# Patient Record
Sex: MALE | Race: WHITE
[De-identification: names, ages, dates, MRNs, and addresses within clinical notes are randomized per-mention and may not be internally consistent; named-entity substitution may affect disease eponyms.]

---

## 2020-01-24 ENCOUNTER — HOSPITAL ENCOUNTER (INPATIENT)
Dept: HOSPITAL 93 - ER | Age: 85
LOS: 37 days | DRG: 981 | End: 2020-03-01
Attending: ORTHOPAEDIC SURGERY | Admitting: ORTHOPAEDIC SURGERY
Payer: COMMERCIAL

## 2020-01-24 VITALS — BODY MASS INDEX: 19.7 KG/M2 | HEIGHT: 68 IN | WEIGHT: 130 LBS

## 2020-01-24 DIAGNOSIS — J15.7: ICD-10-CM

## 2020-01-24 DIAGNOSIS — D50.8: ICD-10-CM

## 2020-01-24 DIAGNOSIS — N35.811: ICD-10-CM

## 2020-01-24 DIAGNOSIS — N40.0: ICD-10-CM

## 2020-01-24 DIAGNOSIS — F02.80: ICD-10-CM

## 2020-01-24 DIAGNOSIS — R13.14: ICD-10-CM

## 2020-01-24 DIAGNOSIS — K29.00: ICD-10-CM

## 2020-01-24 DIAGNOSIS — T17.898A: ICD-10-CM

## 2020-01-24 DIAGNOSIS — S72.141A: ICD-10-CM

## 2020-01-24 DIAGNOSIS — E87.0: ICD-10-CM

## 2020-01-24 DIAGNOSIS — R65.21: ICD-10-CM

## 2020-01-24 DIAGNOSIS — K80.80: ICD-10-CM

## 2020-01-24 DIAGNOSIS — E87.4: ICD-10-CM

## 2020-01-24 DIAGNOSIS — G47.33: ICD-10-CM

## 2020-01-24 DIAGNOSIS — J10.08: ICD-10-CM

## 2020-01-24 DIAGNOSIS — G30.0: ICD-10-CM

## 2020-01-24 DIAGNOSIS — Z79.01: ICD-10-CM

## 2020-01-24 DIAGNOSIS — J95.822: ICD-10-CM

## 2020-01-24 DIAGNOSIS — B37.49: ICD-10-CM

## 2020-01-24 DIAGNOSIS — Z86.73: ICD-10-CM

## 2020-01-24 DIAGNOSIS — B37.1: ICD-10-CM

## 2020-01-24 DIAGNOSIS — J95.89: ICD-10-CM

## 2020-01-24 DIAGNOSIS — D62: ICD-10-CM

## 2020-01-24 DIAGNOSIS — N18.3: ICD-10-CM

## 2020-01-24 DIAGNOSIS — I08.3: ICD-10-CM

## 2020-01-24 DIAGNOSIS — N17.8: ICD-10-CM

## 2020-01-24 DIAGNOSIS — E86.0: ICD-10-CM

## 2020-01-24 DIAGNOSIS — I50.23: ICD-10-CM

## 2020-01-24 DIAGNOSIS — I13.0: ICD-10-CM

## 2020-01-24 DIAGNOSIS — I48.0: Primary | ICD-10-CM

## 2020-01-24 DIAGNOSIS — G72.81: ICD-10-CM

## 2020-01-24 DIAGNOSIS — R31.0: ICD-10-CM

## 2020-01-24 DIAGNOSIS — B96.1: ICD-10-CM

## 2020-01-24 DIAGNOSIS — D65: ICD-10-CM

## 2020-01-24 DIAGNOSIS — J91.8: ICD-10-CM

## 2020-01-24 DIAGNOSIS — Z99.81: ICD-10-CM

## 2020-01-24 DIAGNOSIS — J98.11: ICD-10-CM

## 2020-01-24 DIAGNOSIS — K26.3: ICD-10-CM

## 2020-01-24 DIAGNOSIS — J69.8: ICD-10-CM

## 2020-01-24 NOTE — NUR
PTE ALERTA Y ORIENTADO X3 ACOMPANADO EN SILLA DE CLAUDINE, PTE MARIAH CON SIGO
ADMISION DIRECTA DE .

## 2020-01-24 NOTE — NUR
2300 SE ENTREGA PACIENTE A LEILA
         CONECTADO A MONITOR CARDIACO OXIMETRO DE
PULSO 0.9NSS POR PERIFERAL RT PATENTE BAJANDO A 80ML/HR CARDIZEM DRIP POR
PERIFERAL LT PATENTE NU DE EDEMA Y ERITEMA A 10 ML/HR, DESHAUN CALDERA CON
ORINA AMARILLO ZANDRA, DONOVAN TRATION DE 5 LBS EN PIERNA RT. PACIENTE ALERTA Y
ORIENTADO X3, PENDIENTE REALIZAR CT DE ESTREMIDADES INFERIORES

## 2020-01-24 NOTE — NUR
PT ALERTA Y ORIENTADO X3 ESFERAS SE LE ORIENTA SOBRE TX Y REFIERE ENTEDER. SE
PHOENIX MUESTRAS DE LUIS ENRIQUE Y VENOPUNCION CON TECNICAS ASEPTICSA. SE ADMINISTRAN
IVFLUIDS ORDENADOS. PT TOLERA TX. SE REALIZA EKG Y SE PRESENTA A DR BINGHAM. SE
INTNETA REALIZAR INSERCION DE CALDERA CATETER CON TECNICAS ASEPTICSA Y ESTERILES
CON CALDERA FRANGE- 16, SIN EXITO. SE INTENTA CON CATETER FRANGE 12, SIN EXITO.
FAMILIAR INDICA NO TENER NINGUN PROBLEMA PARA ORINAR, SE JAMISON DESHAUN CALDERA,
PENDIENTE PARA MUESTRA DE U/A. PT TOLERA TX, TRANQUILO Y SIN DIFICULTAD
RESPIRATORIA. 
DR BINGHAM ORDENA TRASLADAR A UNIDAD DE CRITICO. SE TRASLADA PT EN LYNNETTE EN
COMPANIA DE ESCOLTA Y FAMILIAR A UNIDAD DE CRITICO ER Y SE UBICA EN CAMA 2. SE
CONECTA A MONITOR CARDIACO Y SATUROMETRO DE PULSO. A CARGO DE MARCK PARSON.

## 2020-01-24 NOTE — NUR
SE RECIBE MASCULINO ALERTA Y ORIENTADO POR MELVI ESFERAS, EN CAMA CON BARANDAS
SEGURAS Y ELEVADAS. CONECTADO A MONITOR CARDIACO CON OXIMETRIA DE PULSO.
CANALIZADO EN BRAZO DERECHO X1 Y EN BRAZO SEAN X1 CON AREA DE
VENOPUNCIONES LIBRES DE EDEMA O ENROJECIMIENTO. RECIBIENDO 0.9% NSS @ 80 ML/HR.
CARDIZEM DRIP 125MG/100ML @ 10 ML/HR. CONDOM CALDERA PRESENTE. REFIERE TENER
DOLOR EN CAERA Y RODILLA IZQUIERDA. SE MANTIENE NPO. SE MANTIENE EN OBSERVACION
POR CAMBIOS.

## 2020-01-24 NOTE — NUR
SE RECIBE PACIENTE ALERTA Y ORIENTADO X3 ACOMPANADO POR SCRUGGS ENCARGADA, PACIENTE
CON ORDEN DE DR. BINGHAM PARA CONECTAR A MONITOR CARDIACO Y OXIMETRO DE PULSO YA
QUE DR. BINGHAM EVALUA EKG Y PRECENTA FIBRILACION, SE EJECUTA ORDEN MEDICA DE DR. BINGHAM QUE EVALUA A PACIENTE Y ORDENA ADMINISTRAR VOLO IV DE CARDIZEM 15 MG Y
COMENZAR EN DRIP DE CARDIZEM 125MG/100 ML DE NSS A BAJAR A 10ML/HR, SE EJCUTA
ORDEN MEDICA PACIENTE AL MOMENTO CON HR  L/M LUEGO DE ADMINISTRACION DE
VOLO DE 15MG CONVIERTE A 89 L/M. PACIENTE TOLERA TRATAMIENTO SE MANTIENE EN
OBSERVACION POR CAMBIOS. 
 
1950 TECNICO DE ORTOPEDIA A.GUTIERE EVALUA A PACIENTE Y COLOCA DONOVAN TRATION DE
5 LBS EN PIERNA RT POR ORDEN MEDICA. PACIENTE TOLERA PROCEDIMIENTO.
 
2100 SE ADMINISTRA MEDICAMENTO LOVENOX 60 MG SUBCUTANEO POR ORDEN MEDICA DE DR. BINGHAM. SE NOTIGICAN CONSULTA A DR.RIVERA [place] PACIENTE DE DR. MELO.

## 2020-01-25 PROCEDURE — B246ZZZ ULTRASONOGRAPHY OF RIGHT AND LEFT HEART: ICD-10-PCS | Performed by: INTERNAL MEDICINE

## 2020-01-25 PROCEDURE — 30233N1 TRANSFUSION OF NONAUTOLOGOUS RED BLOOD CELLS INTO PERIPHERAL VEIN, PERCUTANEOUS APPROACH: ICD-10-PCS | Performed by: ORTHOPAEDIC SURGERY

## 2020-01-25 PROCEDURE — BQ20ZZZ COMPUTERIZED TOMOGRAPHY (CT SCAN) OF RIGHT HIP: ICD-10-PCS | Performed by: ORTHOPAEDIC SURGERY

## 2020-01-25 NOTE — NUR
SE ORIENTA PACIENTE SOBRE MEDICAMENTO ORDENADO PARA DOLOR.REFIERE COMPRENDER,
DESEAR EL MISMO. SE ADMINISTRA MEDICAMENTO ALAN ORDEN MEDICA SIGUIENDO MEDIDAS
ASEPTICAS. PACIENTE NO PRESENTA REACCION ADVERSA.

## 2020-01-25 NOTE — NUR
SE RECIBE DE TURNO ANTERIOR EN ICU-2. MASCULINO DE 87 ANOS,ALERTA,ORIENTADO EN
BARRY MELVI ESFERAS. UBICADO EN CAMA 02 CON BARANDAS ELEVADAS,FRENOS,ADAME DE
IDENTIFICACION COLOCADOS POR SEGURIDAD. CONECTADO A MONITOR CARDIACO,OXIMETRIA
DE PULSO CONTINUA,SATURANDO 100%. EXTREMIDADES SUPERIORES LIBRES DE EDEMA.
ABDOMEN BALNDO,PERISTALSIS PRESENTE. VENOPUNCIONES PATENTES,RECIBIENDO 0.9%NaCL
1,000 ML @ 80ML/HR; CARDIZEM 125MG/100ML @ 4ML/HR. LAS MISMAS SE OBSERVAN
LIMPIAS,SECAS,LIBRES DE S/S EDEMA Y/O ERITEMA. PACIENTE TIENE COLOCADO CONDOM
NIRALI. TRACCION EN BLAINE HANEY. PENDIENTE CONSULTA CON ,.
PENDIENTE ESTUDIO CT EXTREMIDADES INFERIORES ALAN ORDEN MEDICA SERA REALIZADO
EN AM. PACIENTE TRANQUILO,DESCANSANDO EN CAMA.

## 2020-01-28 PROCEDURE — 5A1945Z RESPIRATORY VENTILATION, 24-96 CONSECUTIVE HOURS: ICD-10-PCS | Performed by: INTERNAL MEDICINE

## 2020-01-30 PROCEDURE — BB24ZZZ COMPUTERIZED TOMOGRAPHY (CT SCAN) OF BILATERAL LUNGS: ICD-10-PCS | Performed by: INTERNAL MEDICINE

## 2020-01-31 PROCEDURE — 8E0ZXY6 ISOLATION: ICD-10-PCS | Performed by: INTERNAL MEDICINE

## 2020-02-19 PROCEDURE — 3E0G76Z INTRODUCTION OF NUTRITIONAL SUBSTANCE INTO UPPER GI, VIA NATURAL OR ARTIFICIAL OPENING: ICD-10-PCS | Performed by: INTERNAL MEDICINE

## 2020-02-19 PROCEDURE — 0DH63UZ INSERTION OF FEEDING DEVICE INTO STOMACH, PERCUTANEOUS APPROACH: ICD-10-PCS | Performed by: INTERNAL MEDICINE

## 2020-02-25 PROCEDURE — 0T9B70Z DRAINAGE OF BLADDER WITH DRAINAGE DEVICE, VIA NATURAL OR ARTIFICIAL OPENING: ICD-10-PCS | Performed by: INTERNAL MEDICINE

## 2020-02-26 PROCEDURE — 02HV33Z INSERTION OF INFUSION DEVICE INTO SUPERIOR VENA CAVA, PERCUTANEOUS APPROACH: ICD-10-PCS | Performed by: INTERNAL MEDICINE

## 2020-02-28 PROCEDURE — 0QH636Z INSERTION OF INTRAMEDULLARY INTERNAL FIXATION DEVICE INTO RIGHT UPPER FEMUR, PERCUTANEOUS APPROACH: ICD-10-PCS | Performed by: ORTHOPAEDIC SURGERY

## 2020-02-28 PROCEDURE — 4A033R1 MEASUREMENT OF ARTERIAL SATURATION, PERIPHERAL, PERCUTANEOUS APPROACH: ICD-10-PCS | Performed by: ORTHOPAEDIC SURGERY

## 2020-03-01 PROCEDURE — 0BH17EZ INSERTION OF ENDOTRACHEAL AIRWAY INTO TRACHEA, VIA NATURAL OR ARTIFICIAL OPENING: ICD-10-PCS | Performed by: INTERNAL MEDICINE

## 2020-03-01 PROCEDURE — 5A1935Z RESPIRATORY VENTILATION, LESS THAN 24 CONSECUTIVE HOURS: ICD-10-PCS | Performed by: INTERNAL MEDICINE
